# Patient Record
Sex: MALE | Race: OTHER | NOT HISPANIC OR LATINO
[De-identification: names, ages, dates, MRNs, and addresses within clinical notes are randomized per-mention and may not be internally consistent; named-entity substitution may affect disease eponyms.]

---

## 2022-03-10 ENCOUNTER — TRANSCRIPTION ENCOUNTER (OUTPATIENT)
Age: 37
End: 2022-03-10

## 2022-03-10 ENCOUNTER — APPOINTMENT (OUTPATIENT)
Dept: GASTROENTEROLOGY | Facility: CLINIC | Age: 37
End: 2022-03-10
Payer: COMMERCIAL

## 2022-03-10 VITALS — HEART RATE: 81 BPM | HEIGHT: 71 IN | BODY MASS INDEX: 37.8 KG/M2 | WEIGHT: 270 LBS | TEMPERATURE: 98.6 F

## 2022-03-10 DIAGNOSIS — Z86.79 PERSONAL HISTORY OF OTHER DISEASES OF THE CIRCULATORY SYSTEM: ICD-10-CM

## 2022-03-10 DIAGNOSIS — Z72.89 OTHER PROBLEMS RELATED TO LIFESTYLE: ICD-10-CM

## 2022-03-10 DIAGNOSIS — Z82.49 FAMILY HISTORY OF ISCHEMIC HEART DISEASE AND OTHER DISEASES OF THE CIRCULATORY SYSTEM: ICD-10-CM

## 2022-03-10 DIAGNOSIS — Z78.9 OTHER SPECIFIED HEALTH STATUS: ICD-10-CM

## 2022-03-10 DIAGNOSIS — Z80.0 FAMILY HISTORY OF MALIGNANT NEOPLASM OF DIGESTIVE ORGANS: ICD-10-CM

## 2022-03-10 PROCEDURE — 99204 OFFICE O/P NEW MOD 45 MIN: CPT

## 2022-03-10 PROCEDURE — 91200 LIVER ELASTOGRAPHY: CPT

## 2022-03-11 PROBLEM — Z80.0 FAMILY HISTORY OF MALIGNANT NEOPLASM OF COLON: Status: ACTIVE | Noted: 2022-03-11

## 2022-03-11 PROBLEM — Z78.9 NON-SMOKER: Status: ACTIVE | Noted: 2022-03-11

## 2022-03-11 PROBLEM — Z82.49 FAMILY HISTORY OF CORONARY ARTERY DISEASE: Status: ACTIVE | Noted: 2022-03-11

## 2022-03-11 PROBLEM — Z86.79 HISTORY OF HYPERTENSION: Status: RESOLVED | Noted: 2022-03-11 | Resolved: 2022-03-11

## 2022-03-11 PROBLEM — Z72.89 CONSUMES ALCOHOL: Status: ACTIVE | Noted: 2022-03-11

## 2022-03-11 NOTE — HISTORY OF PRESENT ILLNESS
[Tattoo] : tattoo(s) [Fever] : denies fever [Yellow Skin Or Eyes (Jaundice)] : denies jaundice [IV Drug Use] : no IV drug use [Transplant before 1992] : no transplant before 1992 [de-identified] : Aware of fatty liver for few years during annual physical. Chart shows elevated liver tests since 2016.\par AST 50  ALT 84 ALP 48. Recent labs in Feb 2022 showed AST 78  ALP 68. No herbal supplements, protein powders, or antibiotics. Only takes losartan for HTN. Drinks alcohol but cut back. Used to drink about 5 drinks 4 times a week but not drinks only on weekends. \par No jaundice confusion hematemesis or melena. \par Has upper abdominal pain for few years at times.  Reflux with spicy food which goes away with TUMS.\par EGD and colon in 2016 reported as normal. No liver biopsy.\par Not diabetic. HTN. Was 180 lb when 20. been around 275 lb for 2 years but lost few lb over past  month.

## 2022-03-11 NOTE — PHYSICAL EXAM
[General Appearance - Alert] : alert [General Appearance - In No Acute Distress] : in no acute distress [General Appearance - Well Nourished] : well nourished [Sclera] : the sclera and conjunctiva were normal [Neck Appearance] : the appearance of the neck was normal [Respiration, Rhythm And Depth] : normal respiratory rhythm and effort [Auscultation Breath Sounds / Voice Sounds] : lungs were clear to auscultation bilaterally [Apical Impulse] : the apical impulse was normal [Heart Sounds] : normal S1 and S2 [Abdomen Soft] : soft [Abdomen Tenderness] : non-tender [] : no rash [No Focal Deficits] : no focal deficits [Oriented To Time, Place, And Person] : oriented to person, place, and time [Scleral Icterus] : No Scleral Icterus [Spider Angioma] : No spider angioma(s) were observed [Jaundice] : No jaundice [FreeTextEntry1] : [employer]

## 2022-03-11 NOTE — ASSESSMENT
[FreeTextEntry1] : 36 year old with obesity HTN seen with elevated liver tests\par \par Elevated transaminases - likely BORDEN\par Transaminases 2  -  3 x ULN\par Hx of HTN with obesity BMI 37\par Viral serologies for Hep A Hep B C negative. \par US abdomen shows enlarged liver with fatty changes\par Will get complete CLD work up. 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

## 2022-03-11 NOTE — REVIEW OF SYSTEMS
[Fever] : no fever [Eye Pain] : no eye pain [Earache] : no earache [Constipation] : no constipation [Diarrhea] : no diarrhea [Joint Swelling] : no joint swelling [FreeTextEntry8] : Hx of urethral surgery due congenital absence

## 2022-03-11 NOTE — CONSULT LETTER
[Dear  ___] : Dear  [unfilled], [Consult Letter:] : I had the pleasure of evaluating your patient, [unfilled]. [Please see my note below.] : Please see my note below. [Consult Closing:] : Thank you very much for allowing me to participate in the care of this patient.  If you have any questions, please do not hesitate to contact me. [DrMatthew  ___] : Dr. CLOUD [FreeTextEntry2] : Dr Osmani Bell

## 2022-03-11 NOTE — REASON FOR VISIT
[Consultation] : a consultation visit [FreeTextEntry1] : NP - Ref by Dr. Bell for Abnormal Liver test

## 2022-04-01 LAB
A1AT PHENOTYP SERPL-IMP: NORMAL
A1AT SERPL-MCNC: 117 MG/DL
ACE BLD-CCNC: 61 U/L
ALBUMIN SERPL ELPH-MCNC: 5.3 G/DL
ALP BLD-CCNC: 65 U/L
ALT SERPL-CCNC: 144 U/L
ANA SER IF-ACNC: NEGATIVE
ANION GAP SERPL CALC-SCNC: 15 MMOL/L
AST SERPL-CCNC: 71 U/L
BASOPHILS # BLD AUTO: 0.03 K/UL
BASOPHILS NFR BLD AUTO: 0.5 %
BILIRUB SERPL-MCNC: 0.7 MG/DL
BUN SERPL-MCNC: 14 MG/DL
CALCIUM SERPL-MCNC: 10.1 MG/DL
CERULOPLASMIN SERPL-MCNC: 23 MG/DL
CHLORIDE SERPL-SCNC: 100 MMOL/L
CO2 SERPL-SCNC: 25 MMOL/L
COPPER 24H UR-MCNC: 17 UG/24 HR
COPPER SERPL-MCNC: 104 UG/DL
COPPER UR-MCNC: 6 UG/L
COPPER/CREATININE RATIO: 9 UG/G CREAT
CREAT SERPL-MCNC: 1 MG/DL
CREATININE, URINE: 0.65 G/L
DEPRECATED KAPPA LC FREE/LAMBDA SER: 1.14 RATIO
EGFR: 99 ML/MIN/1.73M2
EOSINOPHIL # BLD AUTO: 0.14 K/UL
EOSINOPHIL NFR BLD AUTO: 2.5 %
ESTIMATED AVERAGE GLUCOSE: 97 MG/DL
FERRITIN SERPL-MCNC: 818 NG/ML
GLUCOSE SERPL-MCNC: 89 MG/DL
HBA1C MFR BLD HPLC: 5 %
HBV SURFACE AB SER QL: REACTIVE
HCT VFR BLD CALC: 42.5 %
HEPATITIS A IGG ANTIBODY: NONREACTIVE
HGB BLD-MCNC: 14.9 G/DL
IGA SER QL IEP: 46 MG/DL
IGG SER QL IEP: 732 MG/DL
IGM SER QL IEP: 45 MG/DL
IMM GRANULOCYTES NFR BLD AUTO: 0.2 %
INR PPP: 1.04 RATIO
IRON SATN MFR SERPL: 18 %
IRON SERPL-MCNC: 69 UG/DL
KAPPA LC CSF-MCNC: 0.72 MG/DL
KAPPA LC SERPL-MCNC: 0.82 MG/DL
LKM AB SER QL IF: <20.1 UNITS
LYMPHOCYTES # BLD AUTO: 1.47 K/UL
LYMPHOCYTES NFR BLD AUTO: 25.9 %
MAN DIFF?: NORMAL
MCHC RBC-ENTMCNC: 29.2 PG
MCHC RBC-ENTMCNC: 35.1 G/DL
MCV RBC AUTO: 83.3 FL
MITOCHONDRIA AB SER IF-ACNC: NORMAL
MONOCYTES # BLD AUTO: 0.63 K/UL
MONOCYTES NFR BLD AUTO: 11.1 %
NEUTROPHILS # BLD AUTO: 3.39 K/UL
NEUTROPHILS NFR BLD AUTO: 59.8 %
PLATELET # BLD AUTO: 227 K/UL
POTASSIUM SERPL-SCNC: 4.3 MMOL/L
PROT SERPL-MCNC: 7.2 G/DL
PT BLD: 11.9 SEC
RBC # BLD: 5.1 M/UL
RBC # FLD: 12.2 %
SMOOTH MUSCLE AB SER QL IF: NORMAL
SODIUM SERPL-SCNC: 140 MMOL/L
TIBC SERPL-MCNC: 382 UG/DL
TSH SERPL-ACNC: 3.98 UIU/ML
TTG IGA SER IA-ACNC: <1.2 U/ML
TTG IGA SER-ACNC: NEGATIVE
UIBC SERPL-MCNC: 313 UG/DL
WBC # FLD AUTO: 5.67 K/UL

## 2022-04-14 ENCOUNTER — APPOINTMENT (OUTPATIENT)
Dept: GASTROENTEROLOGY | Facility: CLINIC | Age: 37
End: 2022-04-14
Payer: COMMERCIAL

## 2022-04-14 PROCEDURE — 99442: CPT

## 2022-04-15 NOTE — ASSESSMENT
[FreeTextEntry1] : 36 year old with obesity HTN seen with elevated liver tests due to BORDEN\par \par BORDEN\par Transaminases 2  -  3 x ULN\par Hx of HTN with obesity BMI 37\par Viral serologies for Hep A Hep B C negative. \par A1c 5 Fe sat 18 %\par Autoimmune serology negative, Ig G M normal, urine copper negative. 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 Will check lysosomal lipase \par \par Health maintenance\par Hep A not immune and will vaccinate\par Hepatitis B immune\par On colon cancer surveillance with GI due to family hx\par HCV negative

## 2022-04-15 NOTE — REVIEW OF SYSTEMS
[Fever] : no fever [Chills] : no chills [Eye Pain] : no eye pain [Chest Pain] : no chest pain [Palpitations] : no palpitations

## 2022-04-15 NOTE — HISTORY OF PRESENT ILLNESS
[Tattoo] : tattoo(s) [Fever] : denies fever [Yellow Skin Or Eyes (Jaundice)] : denies jaundice [FreeTextEntry4] : Jin [IV Drug Use] : no IV drug use [Transplant before 1992] : no transplant before 1992 [de-identified] : Doing well. No  abdominal pain jaundice hematemesis or melena. Eating healthy and exercising. Lost 22 lb.  [de-identified] : Aware of fatty liver for few years during annual physical. Chart shows elevated liver tests since 2016.\par AST 50  ALT 84 ALP 48. Recent labs in Feb 2022 showed AST 78  ALP 68. No herbal supplements, protein powders, or antibiotics. Only takes losartan for HTN. Drinks alcohol but cut back. Used to drink about 5 drinks 4 times a week but not drinks only on weekends. \par No jaundice confusion hematemesis or melena. \par Has upper abdominal pain for few years at times.  Reflux with spicy food which goes away with TUMS.\par EGD and colon in 2016 reported as normal. No liver biopsy.\par Not diabetic. HTN. Was 180 lb when 20. been around 275 lb for 2 years but lost few lb over past  month.  [de-identified] : NO [de-identified] : No [de-identified] : No

## 2022-10-07 ENCOUNTER — TRANSCRIPTION ENCOUNTER (OUTPATIENT)
Age: 37
End: 2022-10-07

## 2022-10-07 ENCOUNTER — LABORATORY RESULT (OUTPATIENT)
Age: 37
End: 2022-10-07

## 2022-10-13 ENCOUNTER — APPOINTMENT (OUTPATIENT)
Dept: GASTROENTEROLOGY | Facility: CLINIC | Age: 37
End: 2022-10-13

## 2022-10-13 VITALS
SYSTOLIC BLOOD PRESSURE: 150 MMHG | BODY MASS INDEX: 40.46 KG/M2 | DIASTOLIC BLOOD PRESSURE: 90 MMHG | HEART RATE: 71 BPM | WEIGHT: 289 LBS | OXYGEN SATURATION: 98 % | HEIGHT: 71 IN

## 2022-10-13 VITALS
OXYGEN SATURATION: 98 % | HEIGHT: 71 IN | SYSTOLIC BLOOD PRESSURE: 110 MMHG | BODY MASS INDEX: 40.46 KG/M2 | WEIGHT: 289 LBS | DIASTOLIC BLOOD PRESSURE: 71 MMHG | HEART RATE: 56 BPM

## 2022-10-13 DIAGNOSIS — E66.9 OBESITY, UNSPECIFIED: ICD-10-CM

## 2022-10-13 DIAGNOSIS — K76.0 FATTY (CHANGE OF) LIVER, NOT ELSEWHERE CLASSIFIED: ICD-10-CM

## 2022-10-13 DIAGNOSIS — R74.01 ELEVATION OF LEVELS OF LIVER TRANSAMINASE LEVELS: ICD-10-CM

## 2022-10-13 DIAGNOSIS — Z00.00 ENCOUNTER FOR GENERAL ADULT MEDICAL EXAMINATION W/OUT ABNORMAL FINDINGS: ICD-10-CM

## 2022-10-13 PROCEDURE — 91200 LIVER ELASTOGRAPHY: CPT

## 2022-10-13 PROCEDURE — 99214 OFFICE O/P EST MOD 30 MIN: CPT

## 2022-10-14 NOTE — ASSESSMENT
[FreeTextEntry1] : 36 year old with obesity HTN seen with elevated liver tests due to BORDEN\par \par BORDEN\par Transaminases 2  -  3 x ULN\par Hx of HTN with obesity BMI 37\par Viral serologies for Hep A Hep B C negative. \par A1c 5 Fe sat 18 % Ferritin 818\par Autoimmune serology negative, Ig G M normal, urine copper negative. A1AT MM \par US abdomen shows enlarged liver with fatty changes\par Reports changes in diet and regular exercise - lost 60 lb in 6 months, \par though not seen on wt recording here. 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 \par \par Health maintenance\par Hep A not immune and will vaccinate. Ordered. \par Hepatitis B immune\par On colon cancer surveillance with GI due to family hx\par HCV negative

## 2022-10-14 NOTE — HISTORY OF PRESENT ILLNESS
[Tattoo] : tattoo(s) [Fever] : denies fever [Yellow Skin Or Eyes (Jaundice)] : denies jaundice [IV Drug Use] : no IV drug use [Transplant before 1992] : no transplant before 1992 [de-identified] : Reports that he lost 60 lb over 6 months. No  abdominal pain jaundice hematemesis or melena. Eating healthyand exercising. [de-identified] : Aware of fatty liver for few years during annual physical. Chart shows elevated liver tests since 2016.\par AST 50  ALT 84 ALP 48. Recent labs in Feb 2022 showed AST 78  ALP 68. No herbal supplements, protein powders, or antibiotics. Only takes losartan for HTN. Drinks alcohol but cut back. Used to drink about 5 drinks 4 times a week but not drinks only on weekends. \par No jaundice confusion hematemesis or melena. \par Has upper abdominal pain for few years at times.  Reflux with spicy food which goes away with TUMS.\par EGD and colon in 2016 reported as normal. No liver biopsy.\par Not diabetic. HTN. Was 180 lb when 20. been around 275 lb for 2 years but lost few lb over past  month.  [de-identified] : NO [de-identified] : No [de-identified] : No

## 2022-10-14 NOTE — PHYSICAL EXAM
[General Appearance - Alert] : alert [General Appearance - In No Acute Distress] : in no acute distress [General Appearance - Well Nourished] : well nourished [Sclera] : the sclera and conjunctiva were normal [Neck Appearance] : the appearance of the neck was normal [Respiration, Rhythm And Depth] : normal respiratory rhythm and effort [Auscultation Breath Sounds / Voice Sounds] : lungs were clear to auscultation bilaterally [Apical Impulse] : the apical impulse was normal [Heart Sounds] : normal S1 and S2 [Abdomen Soft] : soft [Abdomen Tenderness] : non-tender [] : no rash [No Focal Deficits] : no focal deficits [Oriented To Time, Place, And Person] : oriented to person, place, and time [Scleral Icterus] : No Scleral Icterus [Spider Angioma] : No spider angioma(s) were observed [Jaundice] : No jaundice [FreeTextEntry1] : [employer]  [employer] for courage and boxer with victory

## 2022-10-14 NOTE — REVIEW OF SYSTEMS
[Negative] : Endocrine [Fever] : no fever [Chills] : no chills [Eye Pain] : no eye pain [Earache] : no earache [Loss Of Hearing] : no hearing loss [Chest Pain] : no chest pain [Palpitations] : no palpitations [Cough] : no cough [SOB on Exertion] : no shortness of breath during exertion [Abdominal Pain] : no abdominal pain [Vomiting] : no vomiting [Constipation] : no constipation [Diarrhea] : no diarrhea [Heartburn] : no heartburn [Melena] : no melena [Joint Swelling] : no joint swelling [Joint Stiffness] : no joint stiffness [Skin Lesions] : no skin lesions [Anxiety] : no anxiety [Depression] : no depression [Easy Bleeding] : no tendency for easy bleeding [Easy Bruising] : no tendency for easy bruising

## 2023-04-13 ENCOUNTER — APPOINTMENT (OUTPATIENT)
Dept: GASTROENTEROLOGY | Facility: CLINIC | Age: 38
End: 2023-04-13